# Patient Record
Sex: FEMALE | Race: WHITE | Employment: OTHER | URBAN - METROPOLITAN AREA
[De-identification: names, ages, dates, MRNs, and addresses within clinical notes are randomized per-mention and may not be internally consistent; named-entity substitution may affect disease eponyms.]

---

## 2019-07-10 ENCOUNTER — HOSPITAL ENCOUNTER (OUTPATIENT)
Dept: MAMMOGRAPHY | Age: 84
Discharge: HOME | End: 2019-07-12
Payer: COMMERCIAL

## 2019-07-10 DIAGNOSIS — Z12.39 BREAST CANCER SCREENING: ICD-10-CM

## 2019-07-10 PROCEDURE — 77063 BREAST TOMOSYNTHESIS BI: CPT

## 2020-12-10 ENCOUNTER — HOSPITAL ENCOUNTER (OUTPATIENT)
Dept: MAMMOGRAPHY | Age: 85
Discharge: HOME | End: 2020-12-12
Payer: MEDICARE

## 2020-12-10 PROCEDURE — 77067 SCR MAMMO BI INCL CAD: CPT

## 2022-01-13 ENCOUNTER — HOSPITAL ENCOUNTER (EMERGENCY)
Age: 87
Discharge: HOME | End: 2022-01-13
Payer: MEDICARE

## 2022-01-13 ENCOUNTER — APPOINTMENT (OUTPATIENT)
Dept: CT IMAGING | Age: 87
End: 2022-01-13
Payer: MEDICARE

## 2022-01-13 VITALS
WEIGHT: 130 LBS | OXYGEN SATURATION: 95 % | TEMPERATURE: 97.3 F | BODY MASS INDEX: 25.52 KG/M2 | SYSTOLIC BLOOD PRESSURE: 138 MMHG | HEART RATE: 65 BPM | HEIGHT: 60 IN | RESPIRATION RATE: 18 BRPM | DIASTOLIC BLOOD PRESSURE: 84 MMHG

## 2022-01-13 DIAGNOSIS — S09.90XA ACUTE HEAD INJURY WITHOUT LOSS OF CONSCIOUSNESS, INITIAL ENCOUNTER: Primary | ICD-10-CM

## 2022-01-13 DIAGNOSIS — S80.12XA CONTUSION OF LEFT LOWER LEG, INITIAL ENCOUNTER: ICD-10-CM

## 2022-01-13 PROCEDURE — 70450 CT HEAD/BRAIN W/O DYE: CPT

## 2022-01-13 PROCEDURE — 99211 OFF/OP EST MAY X REQ PHY/QHP: CPT

## 2022-01-13 RX ORDER — ASPIRIN 81 MG/1
81 TABLET ORAL DAILY
COMMUNITY

## 2022-01-13 NOTE — ED PROVIDER NOTES
[address] [facility]  Department of Emergency Medicine   ED  Encounter Note  Admit Date/RoomTime: 2022  4:49 PM  ED Room: MAN/MAN    NAME: Octavia Gore  : 1929  MRN: [id number]     Chief Complaint:  Fall (at [facility] fell and hit shin left leg  and back of head on a piller   floor uneven  today at 340pm fell wozzie )    History of Present Illness       Octavia Gore is a 80 y.o. old female who presents to the emergency department for evaluation after a fall. Patient states about 2 hours ago while shopping in [place] she fell. States that she was holding onto the buggy with her left hand. Went to turn and she stepped on the elevated edge between the carpet and the tiled floor. Lost her balance and fell. States she came down, striking her left shin on the buggy. Thinks she landed on her butt. And she went a little bit to the left side and bumped her head on one of the pillars. She denies loss of consciousness. She denies a headache. Denies dizziness. She states she feels a little woozy, but does not know how to describe that. Patient denies any nausea or vomiting. Patient is not on any blood thinners. She does take a baby aspirin daily. Patient does want a scan of her head. She is concerned for a head bleed. Patient denies any low back or buttock pain. Really the only area of pain she is having is to her left shin where she hit it. It is mild in severity. ROS   Pertinent positives and negatives are stated within HPI, all other systems reviewed and are negative. Past Medical History:  has a past medical history of Hyperlipidemia and Hypertension. Surgical History:  has no past surgical history on file. Social History:  reports that she has never smoked. She has never used smokeless tobacco. She reports that she does not drink alcohol. Family History: family history is not on file.      Allergies: Ciprofloxacin, Pcn [penicillins], and Sulfa antibiotics    Physical Exam   Oxygen Saturation Interpretation: Normal.        ED Triage Vitals [01/13/22 1653]   BP Temp Temp src Pulse Resp SpO2 Height Weight   138/84 97.3 °F (36.3 °C) -- 65 18 95 % 5' (1.524 m) 130 lb (59 kg)       \General:  NAD. Alert and Oriented. Well-appearing. Skin:  Warm, dry. No rashes. Head:  Normocephalic. Atraumatic. Eyes:  EOMI. Conjunctiva normal.  ENT:  Oral mucosa moist.  Airway patent. Pupils equal round and reactive to light. Neck:  Supple. Normal ROM. Respiratory:  No respiratory distress. No labored breathing. Lungs clear without rales, rhonchi or wheezing. Cardiovascular:  Regular rate. No peripheral edema. Extremities warm and good color. Extremities:  Normal ROM. Purple, ecchymotic bruise to the left lower leg, anterior aspect, mid aspect. Left knee is nontender to palpation. Left ankle is nontender to palpation. Back:  Normal ROM. Nontender to palpation. Tailbone is nontender to palpation. Neuro:  Alert and Oriented to person, place, time and situation. Normal LOC. Moves all extremities. Speech fluent. Psych:  Calm and Cooperative. Normal thought process. Normal judgement. Lab / Imaging Results   (All laboratory and radiology results have been personally reviewed by myself)  Labs:  No results found for this visit on 01/13/22. Imaging: All Radiology results interpreted by Radiologist unless otherwise noted. CT Head WO Contrast   Final Result   1. No acute intracranial abnormality. 2. Chronic small vessel ischemic disease. RECOMMENDATIONS:   Unavailable           ED Course / Medical Decision Making   Medications - No data to display     Re-examination:  1/13/22     Time:   Patients symptoms . Consult(s):   None    Procedure(s):  None    MDM:   Patient wants a CT of her head. She is being sent to [facility] radiology department for an outpatient head CT.    1820 results discussed with patient on the phone.   There is no acute process. No intracranial bleed. Patient has been instructed she may go home. Tylenol as needed. Plan of Care/Counseling:  Physician Assistant on duty reviewed today's visit with the patient in addition to providing specific details for the plan of care and counseling regarding the diagnosis and prognosis. Questions are answered at this time and are agreeable with the plan. Assessment      1. Acute head injury without loss of consciousness, initial encounter New Problem   2. Contusion of left lower leg, initial encounter New Problem     Plan   Discharged home. Patient condition is good    New Medications     New Prescriptions    No medications on file     Electronically signed by Berenda Skiff, PA   DD: 1/13/22  **This report was transcribed using voice recognition software. Every effort was made to ensure accuracy; however, inadvertent computerized transcription errors may be present.   END OF ED PROVIDER NOTE       Berenda Skiff, [address]  01/13/22 9851

## 2022-02-17 ENCOUNTER — HOSPITAL ENCOUNTER (OUTPATIENT)
Dept: MAMMOGRAPHY | Age: 87
Discharge: HOME | End: 2022-02-19
Payer: MEDICARE

## 2022-02-17 VITALS — BODY MASS INDEX: 27.21 KG/M2 | HEIGHT: 59 IN | WEIGHT: 135 LBS

## 2022-02-17 DIAGNOSIS — Z12.31 VISIT FOR SCREENING MAMMOGRAM: ICD-10-CM

## 2022-02-17 PROCEDURE — 77063 BREAST TOMOSYNTHESIS BI: CPT

## 2022-08-12 RX ORDER — CHOLECALCIFEROL (VITAMIN D3) 125 MCG
CAPSULE ORAL DAILY
COMMUNITY

## 2022-08-15 NOTE — H&P
History and Physical    Patient's Name/Date of Birth: Yazmin Sheppard / 7/27/1929 (1 y.o.)    Date: August 15, 2022     Chief Complaint: Left hand pain and paresthesias, progressive. HPI: This is a 80years of age, left-hand-dominant, white female, has had pain and paresthesias of her bilateral hands for the past year. She has failed 1 year of conservative management, including behavior modification, nighttime splints, and anti-inflammatories as needed. Symptoms are very progressive with frequent awakenings at night and often severe palmar pains. She describes paresthesias both in the form of prominent numbness and tingling. Based on her age, we have trialed conservative management for 3 to 4 months at a time, which is not solving her problems. Patient returns now for both physical examination, as well as, verbal/written informed consent for outpatient, bilateral staged, carpal tunnel releases. Her son Terry Jaeger is here who is a frequent patient of our office, and is also helping with the consent. Past Medical History:   Diagnosis Date    Hyperlipidemia     Hypertension     Thyroid disease        Past Surgical History:   Procedure Laterality Date    CHOLECYSTECTOMY      EYE SURGERY      HYSTERECTOMY (CERVIX STATUS UNKNOWN)      TONSILLECTOMY         Prior to Admission medications    Medication Sig Start Date End Date Taking?  Authorizing Provider   Cholecalciferol (VITAMIN D) 125 MCG (5000 UT) CAPS Take by mouth daily   Yes Historical Provider, MD   BIOTIN 5000 PO Take by mouth daily   Yes Historical Provider, MD   amLODIPine Besylate (NORVASC PO) Take 5 mg by mouth daily am    Historical Provider, MD   Levothyroxine Sodium (SYNTHROID PO) Take by mouth  Patient not taking: Reported on 8/12/2022    Historical Provider, MD HERNANDEZ Complex-C-Biotin-D-Zinc-FA (VITAL-D RX PO) Take by mouth  Patient not taking: Reported on 8/12/2022    Historical Provider, MD   aspirin 81 MG EC tablet Take 81 mg by mouth in the morning. Follow Dr. Epi Thompson anticoagulation orders. Historical Provider, MD   atenolol (TENORMIN) 25 MG tablet Take 12.5 mg by mouth in the morning. am.    Historical Provider, MD   simvastatin (ZOCOR) 10 MG tablet Take 10 mg by mouth nightly. Historical Provider, MD   lisinopril (PRINIVIL;ZESTRIL) 40 MG tablet Take 40 mg by mouth in the morning. am.    Historical Provider, MD   pantoprazole (PROTONIX) 20 MG tablet Take 20 mg by mouth in the morning. am.    Historical Provider, MD       Pcn [penicillins], Ciprofloxacin, and Sulfa antibiotics    Family History   Problem Relation Age of Onset    Prostate Cancer Father     Prostate Cancer Son        Social History     Socioeconomic History    Marital status:       Spouse name: Not on file    Number of children: Not on file    Years of education: Not on file    Highest education level: Not on file   Occupational History    Not on file   Tobacco Use    Smoking status: Never    Smokeless tobacco: Never   Vaping Use    Vaping Use: Never used   Substance and Sexual Activity    Alcohol use: No    Drug use: Not on file    Sexual activity: Never   Other Topics Concern    Not on file   Social History Narrative    Not on file     Social Determinants of Health     Financial Resource Strain: Not on file   Food Insecurity: Not on file   Transportation Needs: Not on file   Physical Activity: Not on file   Stress: Not on file   Social Connections: Not on file   Intimate Partner Violence: Not on file   Housing Stability: Not on file       Review of Systems:   CONSTITUTIONAL:  negative  EYES:  negative  HEENT:  negative  RESPIRATORY:  negative  CARDIOVASCULAR:  negative  GASTROINTESTINAL:  negative  GENITOURINARY:  negative  INTEGUMENT/BREAST:  negative  HEMATOLOGIC/LYMPHATIC:  negative  ALLERGIC/IMMUNOLOGIC:  negative  ENDOCRINE:  negative  MUSCULOSKELETAL:  negative  NEUROLOGICAL:  negative  BEHAVIOR/PSYCH:  negative    Physical Exam:  Vitals:    08/12/22 1053 Weight: 130 lb (59 kg)   Height: 5' (1.524 m)       CONSTITUTIONAL:  awake, alert, cooperative, no apparent distress, and appears stated age  EYES:  Lids and lashes normal, pupils equal, round and reactive to light, extra ocular muscles intact, sclera clear, conjunctiva normal  ENT:  Normocephalic, without obvious abnormality, atraumatic, sinuses nontender on palpation, external ears without lesions, oral pharynx with moist mucus membranes, tonsils without erythema or exudates, gums normal and good dentition. NECK:  Supple, symmetrical, trachea midline, no adenopathy, thyroid symmetric, not enlarged and no tenderness, skin normal  HEMATOLOGIC/LYMPHATICS:  no cervical lymphadenopathy  BACK:  Symmetric, no curvature, spinous processes are non-tender on palpation, paraspinous muscles are non-tender on palpation, no costal vertebral tenderness  LUNGS:  No increased work of breathing, good air exchange, clear to auscultation bilaterally, no crackles or wheezing  CARDIOVASCULAR:  normal S1 and S2  ABDOMEN: Deferred  CHEST/BREASTS: Deferred  GENITAL/URINARY: Deferred  MUSCULOSKELETAL: Deferred  NEUROLOGIC: Neurological, light touch sensation abnormal, with hypoesthesia on the right and left.  + Median nerve compression test, severe, bilateral  + Tinel, severe, bilateral.  SKIN:  no bruising or bleeding    Assessment:  Active Problems:    * No active hospital problems. *  Resolved Problems:    * No resolved hospital problems. *  1.   Left hand-carpal tunnel syndrome    Plan:  Left hand-carpal tunnel release    Electronically signed by Dg Platt MD on 8/15/22 at 2:06 PM EDT

## 2022-08-16 ENCOUNTER — ANESTHESIA EVENT (OUTPATIENT)
Dept: OPERATING ROOM | Age: 87
End: 2022-08-16
Payer: MEDICARE

## 2022-08-16 NOTE — ANESTHESIA PRE PROCEDURE
Department of Anesthesiology  Preprocedure Note       Name:  Fallon Bowens   Age:  80 y.o.  :  1929                                          MRN:  [id number]         Date:  2022      Surgeon: Guillermo Fermin):  Bebeto Abdi MD    Procedure: Procedure(s):  LEFT HAND CARPAL TUNNEL RELEASE    Medications prior to admission:   Prior to Admission medications    Medication Sig Start Date End Date Taking? Authorizing Provider   Cholecalciferol (VITAMIN D) 125 MCG (5000 UT) CAPS Take by mouth daily   Yes Historical Provider, MD   BIOTIN 5000 PO Take by mouth daily   Yes Historical Provider, MD   amLODIPine Besylate (NORVASC PO) Take 5 mg by mouth daily am    Historical Provider, MD   Levothyroxine Sodium (SYNTHROID PO) Take by mouth  Patient not taking: Reported on 2022    Historical Provider, MD HERNANDEZ Complex-C-Biotin-D-Zinc-FA (VITAL-D RX PO) Take by mouth  Patient not taking: Reported on 2022    Historical Provider, MD   aspirin 81 MG EC tablet Take 81 mg by mouth in the morning. Follow Dr. Brianna Grewal anticoagulation orders. Historical Provider, MD   atenolol (TENORMIN) 25 MG tablet Take 12.5 mg by mouth in the morning. am.    Historical Provider, MD   simvastatin (ZOCOR) 10 MG tablet Take 10 mg by mouth nightly. Historical Provider, MD   lisinopril (PRINIVIL;ZESTRIL) 40 MG tablet Take 40 mg by mouth in the morning. am.    Historical Provider, MD   pantoprazole (PROTONIX) 20 MG tablet Take 20 mg by mouth in the morning. am.    Historical Provider, MD       Current medications:    No current facility-administered medications for this encounter.      Current Outpatient Medications   Medication Sig Dispense Refill    Cholecalciferol (VITAMIN D) 125 MCG (5000 UT) CAPS Take by mouth daily      BIOTIN 5000 PO Take by mouth daily      amLODIPine Besylate (NORVASC PO) Take 5 mg by mouth daily am      Levothyroxine Sodium (SYNTHROID PO) Take by mouth (Patient not taking: Reported on 2022)      B Complex-C-Biotin-D-Zinc-FA (VITAL-D RX PO) Take by mouth (Patient not taking: Reported on 8/12/2022)      aspirin 81 MG EC tablet Take 81 mg by mouth in the morning. Follow Dr. Epi Thompson anticoagulation orders.  atenolol (TENORMIN) 25 MG tablet Take 12.5 mg by mouth in the morning. am.      simvastatin (ZOCOR) 10 MG tablet Take 10 mg by mouth nightly.  lisinopril (PRINIVIL;ZESTRIL) 40 MG tablet Take 40 mg by mouth in the morning. am.      pantoprazole (PROTONIX) 20 MG tablet Take 20 mg by mouth in the morning. am.         Allergies: Allergies   Allergen Reactions    Pcn [Penicillins] Anaphylaxis    Ciprofloxacin      Unable to remember    Sulfa Antibiotics      Unable to remember       Problem List:  There is no problem list on file for this patient. Past Medical History:        Diagnosis Date    Hyperlipidemia     Hypertension     Thyroid disease        Past Surgical History:        Procedure Laterality Date    CHOLECYSTECTOMY      EYE SURGERY      HYSTERECTOMY (CERVIX STATUS UNKNOWN)      TONSILLECTOMY         Social History:    Social History     Tobacco Use    Smoking status: Never    Smokeless tobacco: Never   Substance Use Topics    Alcohol use: No                                Counseling given: Not Answered      Vital Signs (Current):   Vitals:    08/12/22 1053   Weight: 130 lb (59 kg)   Height: 5' (1.524 m)                                              BP Readings from Last 3 Encounters:   01/13/22 138/84       NPO Status:                                                                                 BMI:   Wt Readings from Last 3 Encounters:   02/17/22 135 lb (61.2 kg)   01/13/22 130 lb (59 kg)     Body mass index is 25.39 kg/m².     CBC: No results found for: WBC, RBC, HGB, HCT, MCV, RDW, PLT    CMP:   Lab Results   Component Value Date/Time    GLUCOSE 85 05/12/2011 08:17 AM       POC Tests: No results for input(s): POCGLU, POCNA, POCK, POCCL, POCBUN, POCHEMO, POCHCT in the last 72 hours. Coags: No results found for: PROTIME, INR, APTT    HCG (If Applicable): No results found for: PREGTESTUR, PREGSERUM, HCG, HCGQUANT     ABGs: No results found for: PHART, PO2ART, UJC9GHS, VCR7MWQ, BEART, F1FSZLPH     Type & Screen (If Applicable):  No results found for: LABABO, LABRH    Drug/Infectious Status (If Applicable):  No results found for: HIV, HEPCAB    COVID-19 Screening (If Applicable): No results found for: COVID19        Anesthesia Evaluation  Patient summary reviewed  Airway: Mallampati: III  TM distance: >3 FB   Neck ROM: full  Mouth opening: > = 3 FB   Dental:      Comment: Dentition intact, molar crowns, denies any loose teeth. Pulmonary:Negative Pulmonary ROS breath sounds clear to auscultation                             Cardiovascular:    (+) hypertension:, murmur: Grade 1, hyperlipidemia        Rhythm: regular  Rate: normal                    Neuro/Psych:   Negative Neuro/Psych ROS              GI/Hepatic/Renal:   (+) GERD:,           Endo/Other:    (+) hypothyroidism::., .                 Abdominal:             Vascular: negative vascular ROS. Other Findings:           Anesthesia Plan      MAC     ASA 3       Induction: intravenous. Anesthetic plan and risks discussed with patient. Plan discussed with CRNA.                     Haydee Waters MD   6-34-53

## 2022-08-17 ENCOUNTER — ANESTHESIA (OUTPATIENT)
Dept: OPERATING ROOM | Age: 87
End: 2022-08-17
Payer: MEDICARE

## 2022-08-17 ENCOUNTER — HOSPITAL ENCOUNTER (OUTPATIENT)
Age: 87
Setting detail: OUTPATIENT SURGERY
Discharge: HOME | End: 2022-08-17
Attending: SURGERY | Admitting: SURGERY
Payer: MEDICARE

## 2022-08-17 VITALS
HEART RATE: 59 BPM | SYSTOLIC BLOOD PRESSURE: 141 MMHG | DIASTOLIC BLOOD PRESSURE: 67 MMHG | RESPIRATION RATE: 14 BRPM | TEMPERATURE: 97 F | OXYGEN SATURATION: 96 % | HEIGHT: 60 IN | BODY MASS INDEX: 25.52 KG/M2 | WEIGHT: 130 LBS

## 2022-08-17 DIAGNOSIS — G56.02 CARPAL TUNNEL SYNDROME ON LEFT: Primary | ICD-10-CM

## 2022-08-17 PROCEDURE — 2580000003 HC RX 258

## 2022-08-17 PROCEDURE — 6360000002 HC RX W HCPCS

## 2022-08-17 PROCEDURE — 7100000010 HC PHASE II RECOVERY - FIRST 15 MIN: Performed by: SURGERY

## 2022-08-17 PROCEDURE — 7100000011 HC PHASE II RECOVERY - ADDTL 15 MIN: Performed by: SURGERY

## 2022-08-17 PROCEDURE — 3700000001 HC ADD 15 MINUTES (ANESTHESIA): Performed by: SURGERY

## 2022-08-17 PROCEDURE — 6370000000 HC RX 637 (ALT 250 FOR IP): Performed by: SURGERY

## 2022-08-17 PROCEDURE — 2720000010 HC SURG SUPPLY STERILE: Performed by: SURGERY

## 2022-08-17 PROCEDURE — 2500000003 HC RX 250 WO HCPCS: Performed by: SURGERY

## 2022-08-17 PROCEDURE — 3600000002 HC SURGERY LEVEL 2 BASE: Performed by: SURGERY

## 2022-08-17 PROCEDURE — 2580000003 HC RX 258: Performed by: ANESTHESIOLOGY

## 2022-08-17 PROCEDURE — 2709999900 HC NON-CHARGEABLE SUPPLY: Performed by: SURGERY

## 2022-08-17 PROCEDURE — 3600000012 HC SURGERY LEVEL 2 ADDTL 15MIN: Performed by: SURGERY

## 2022-08-17 PROCEDURE — 3700000000 HC ANESTHESIA ATTENDED CARE: Performed by: SURGERY

## 2022-08-17 RX ORDER — SODIUM CHLORIDE 9 MG/ML
INJECTION, SOLUTION INTRAVENOUS CONTINUOUS PRN
Status: DISCONTINUED | OUTPATIENT
Start: 2022-08-17 | End: 2022-08-17

## 2022-08-17 RX ORDER — SODIUM CHLORIDE, SODIUM LACTATE, POTASSIUM CHLORIDE, CALCIUM CHLORIDE 600; 310; 30; 20 MG/100ML; MG/100ML; MG/100ML; MG/100ML
INJECTION, SOLUTION INTRAVENOUS CONTINUOUS PRN
Status: DISCONTINUED | OUTPATIENT
Start: 2022-08-17 | End: 2022-08-17 | Stop reason: SDUPTHER

## 2022-08-17 RX ORDER — GINSENG 100 MG
CAPSULE ORAL PRN
Status: DISCONTINUED | OUTPATIENT
Start: 2022-08-17 | End: 2022-08-17 | Stop reason: HOSPADM

## 2022-08-17 RX ORDER — TRAMADOL HYDROCHLORIDE 50 MG/1
50 TABLET ORAL EVERY 4 HOURS PRN
Qty: 6 TABLET | Refills: 0 | Status: SHIPPED | OUTPATIENT
Start: 2022-08-17 | End: 2022-08-20

## 2022-08-17 RX ORDER — FENTANYL CITRATE 50 UG/ML
INJECTION, SOLUTION INTRAMUSCULAR; INTRAVENOUS PRN
Status: DISCONTINUED | OUTPATIENT
Start: 2022-08-17 | End: 2022-08-17 | Stop reason: SDUPTHER

## 2022-08-17 RX ORDER — PROPOFOL 10 MG/ML
INJECTION, EMULSION INTRAVENOUS CONTINUOUS PRN
Status: DISCONTINUED | OUTPATIENT
Start: 2022-08-17 | End: 2022-08-17 | Stop reason: SDUPTHER

## 2022-08-17 RX ORDER — SODIUM CHLORIDE, SODIUM LACTATE, POTASSIUM CHLORIDE, CALCIUM CHLORIDE 600; 310; 30; 20 MG/100ML; MG/100ML; MG/100ML; MG/100ML
INJECTION, SOLUTION INTRAVENOUS CONTINUOUS
Status: DISCONTINUED | OUTPATIENT
Start: 2022-08-17 | End: 2022-08-17 | Stop reason: HOSPADM

## 2022-08-17 RX ADMIN — FENTANYL CITRATE 25 MCG: 50 INJECTION INTRAMUSCULAR; INTRAVENOUS at 08:13

## 2022-08-17 RX ADMIN — SODIUM CHLORIDE, POTASSIUM CHLORIDE, SODIUM LACTATE AND CALCIUM CHLORIDE: 600; 310; 30; 20 INJECTION, SOLUTION INTRAVENOUS at 08:08

## 2022-08-17 RX ADMIN — FENTANYL CITRATE 25 MCG: 50 INJECTION INTRAMUSCULAR; INTRAVENOUS at 08:11

## 2022-08-17 RX ADMIN — SODIUM CHLORIDE, POTASSIUM CHLORIDE, SODIUM LACTATE AND CALCIUM CHLORIDE: 600; 310; 30; 20 INJECTION, SOLUTION INTRAVENOUS at 07:42

## 2022-08-17 RX ADMIN — PROPOFOL 50 MCG/KG/MIN: 10 INJECTION, EMULSION INTRAVENOUS at 08:12

## 2022-08-17 ASSESSMENT — PAIN - FUNCTIONAL ASSESSMENT: PAIN_FUNCTIONAL_ASSESSMENT: 0-10

## 2022-08-17 NOTE — BRIEF OP NOTE
Brief Postoperative Note      Patient: Osvaldo Barrios  YOB: 1929  MRN: [id number]    Date of Procedure: 8/17/2022    Pre-Op Diagnosis: 1. Left Hand-Carpal tunnel syndrome  [G56.02]    Post-Op Diagnosis: Same       Procedure(s):  LEFT HAND- CARPAL TUNNEL RELEASE    Surgeon(s):  Aaron Quan M.D.; Hand + Reconstructive surgery    Assistant:  * No surgical staff found *    Anesthesia: Monitor Anesthesia Care    Estimated Blood Loss (mL): Minimal 3 ml    Complications: None    Specimens:   * No specimens in log *    Implants:  * No implants in log *      Drains: * No LDAs found *    Operative Note: Carpal Tunnel Release    Indications : This is a 80 y.o., left hand dominant female, who has had symptoms dating back 2 yrs. They describe discomfort of their upper extremities extending from the hands to the forearms to the arms. The paresthesias do involve severe numbness and tingling at times. The paraesthesias are constant. I did provide the Initial Evaluation on the patient, at our [facility] on 09/29/2020. They now return because it is more convenient time for outpatient hand surgery, so it does not interfere with their career and weekly schedule. I did obtain verbal informed consent for the outpatient hand surgery, while the patient was in the office. I did meet the patient and their family in the preoperative holding room, to again answer questions and outline the operation. I did harriet the surgery site with a purple marker and my initials. The patient did receive a right upper extremity intravenous (iv.) placement per Nursing Team, and was escorted back to the surgery suite. The patient was laid supine on the operating table and then underwent  iv. sedation per the Anesthesia Team. The operating room bed was rotated 90 degrees away from the anesthesia workbench, and the left upper extremity was abducted 70 degrees away from the patient's body and placed onto a padded arm table.

## 2022-08-17 NOTE — DISCHARGE INSTRUCTIONS
Fantasma Mcgovern M.D. Reconstructive Microsurgery      Elevate your operative hand above your heart level for one week. 2. Do NOT change your dressing. 3. Okay to shower or bathe with plastic bag over \"boxing glove\" dressing. 4. Follow up with Dr. Glenn Rosario at [facility], on Tuesday 08/23/22 at 09:45 a.m     6. Patient will be excused from work or school until after first post operative re-check. 7. No driving unless off pain medication for 24 hrs., and only allowed to drive with the unoperated hand. 8. Dr. Glenn Rosario does not have the Medical/Dental bureau service. If you need to reach Dr. Glenn Rosario after hours,        please call one of the [facility] hospitals:    **  Please tell the , You recently had surgery w/ Dr. Glenn Rosario, and have him paged via his cell phone. Please make sure they do not connect to Office, since no when is there after hours. [facility] [phone]    [address] [address] [place]: [phone] or [facility] at [phone] and they will page him. MARCOS Saenz M.D. Dr. Juni Ornelas M.D. [address] [place] [address], [address] [place] [address] 98 [phone]             Infection After Surgery: Care Instructions  Overview  After surgery, an infection is always possible. It doesn't mean that thesurgery didn't go well. Because an infection can be serious, your doctor has taken steps to manage it. Your doctor checked the infection and cleaned it if necessary. Your doctor may have made an opening in the area so that the pus can drain out.  You may have gauze in the cut so that the area will stay open and swelling, warmth, or redness in the area. Red streaks leading from the area. Pus draining from the wound. A new or higher fever. Watch closely for changes in your health, and be sure to contact your doctor ifyou have any problems. Where can you learn more? Go to [email or web address] and sign in to your [employer] account. Enter C340 in the iPrint box to learn more about \"Infection After Surgery: Care Instructions. \"     If you do not have an account, please click on the \"Sign Up Now\" link. Current as of: January 20, 2022               Content Version: 13.3  © 2006-2022 [employer]. Care instructions adapted under license by [facility] ([facility]). If you have questions about a medical condition or this instruction, always ask your healthcare professional. [place] [address] any warranty or liability for your use of this information. Nausea and Vomiting After Surgery: Care Instructions  Your Care Instructions     After you've had surgery, you may feel sick to your stomach (nauseated) or you may vomit. Sometimes anesthesia can make you feel sick. It's a common side effect and often doesn't last long. Pain also can make you feel sick or vomit. After the anesthesia wears off, you may feel pain from the incision (cut). That pain could then upset your stomach. Taking pain medicine can also make you feelsick to your stomach. Whatever the cause, you may get medicine that can help. There are also somethings you can do at home to prevent nausea and feel better. The doctor has checked you carefully, but problems can develop later. If you notice any problems or new symptoms, get medical treatment right away. Follow-up care is a key part of your treatment and safety. Be sure to make and go to all appointments, and call your doctor if you are having problems. It's also a good idea to know your test results and keep alist of the medicines you take.   How can you care for yourself at home? Be safe with medicines. Read and follow all instructions on the label. If the doctor gave you a prescription medicine for pain, take it as prescribed. If you are not taking a prescription pain medicine, ask your doctor if you can take an over-the-counter medicine. Take your pain medicine as soon as you have pain. It works better if you take it before the pain gets bad. Call your doctor if you have any problems with your medicine. Rest in bed until you feel better. To prevent dehydration, drink plenty of fluids. Choose water and other clear liquids until you feel better. If you have kidney, heart, or liver disease and have to limit fluids, talk with your doctor before you increase the amount of fluids you drink. When you are able to eat, try clear soups, mild foods, and liquids until all symptoms are gone for 12 to 48 hours. Other good choices include dry toast, crackers, cooked cereal, and gelatin dessert, such as Jell-O. Do not smoke. Smoking and being around smoke can make nausea worse. If you need help quitting, talk to your doctor about stop-smoking programs and medicines. These can increase your chances of quitting for good. When should you call for help? Call 911  anytime you think you may need emergency care. For example, call if:    You passed out (lost consciousness). Call your doctor now or seek immediate medical care if:    You have new or worse nausea or vomiting. You are too sick to your stomach to drink any fluids. You cannot keep down fluids. You have symptoms of dehydration, such as:  Dry eyes and a dry mouth. Passing only a little urine. Feeling thirstier than usual.     Your pain medicine is not helping. You are dizzy or lightheaded, or you feel like you may faint. Watch closely for changes in your health, and be sure to contact your doctor if:    You do not get better as expected. Where can you learn more?   Go to [email or web address] and sign in to your [employer] account. Enter M536 in the [facility] box to learn more about \"Nausea and Vomiting After Surgery: Care Instructions. \"     If you do not have an account, please click on the \"Sign Up Now\" link. Current as of: March 9, 2022               Content Version: 13.3  © 2006-2022 [employer]. Care instructions adapted under license by [facility] ([facility]). If you have questions about a medical condition or this instruction, always ask your healthcare professional. [place] [address] any warranty or liability for your use of this information.

## 2022-08-17 NOTE — PROGRESS NOTES
Went over discharge instructions with patient & son. Both state they understand all instructions.  Prescription given

## 2022-08-17 NOTE — ANESTHESIA POSTPROCEDURE EVALUATION
Department of Anesthesiology  Postprocedure Note    Patient: Fallon Bowens  MRN: [id number]  YOB: 1929  Date of evaluation: 8/17/2022      Procedure Summary     Date: 08/17/22 Room / Location: [address] [place] [address] 01 / [address] [facility]    Anesthesia Start: 2863 Anesthesia Stop: 2976    Procedure: LEFT HAND CARPAL TUNNEL RELEASE (Left: Wrist) Diagnosis:       Carpal tunnel syndrome on left      (Carpal tunnel syndrome on left [G56.02])    Surgeons: Bebeto Abdi MD Responsible Provider: Citlali Dexter MD    Anesthesia Type: MAC ASA Status: 3          Anesthesia Type: MAC    Joe Phase I: Joe Score: 10    Joe Phase II: Joe Score: 10      Anesthesia Post Evaluation    Patient location during evaluation: bedside  Patient participation: complete - patient participated  Level of consciousness: awake  Pain score: 0  Airway patency: patent  Nausea & Vomiting: no nausea and no vomiting  Complications: no  Cardiovascular status: hemodynamically stable  Respiratory status: acceptable  Hydration status: euvolemic

## 2022-09-16 RX ORDER — TRIAMCINOLONE ACETONIDE 1 MG/G
CREAM TOPICAL 2 TIMES DAILY
COMMUNITY

## 2022-09-18 ENCOUNTER — ANESTHESIA EVENT (OUTPATIENT)
Dept: OPERATING ROOM | Age: 87
End: 2022-09-18
Payer: MEDICARE

## 2022-09-18 NOTE — ANESTHESIA PRE PROCEDURE
Department of Anesthesiology  Preprocedure Note       Name:  Marianela Perez   Age:  80 y.o.  :  1929                                          MRN:  [id number]         Date:  2022      Surgeon: Arcenio Grimes):  Luz Maria Staples MD    Procedure: Procedure(s):  RIGHT HAND CARPAL TUNNEL RELEASE    Medications prior to admission:   Prior to Admission medications    Medication Sig Start Date End Date Taking? Authorizing Provider   triamcinolone (KENALOG) 0.1 % cream Apply topically 2 times daily Apply topically 2 times daily. Yes Historical Provider, MD   Cholecalciferol (VITAMIN D) 125 MCG (5000 UT) CAPS Take by mouth daily    Historical Provider, MD   BIOTIN 5000 PO Take by mouth daily    Historical Provider, MD   amLODIPine Besylate (NORVASC PO) Take 5 mg by mouth daily am    Historical Provider, MD   Levothyroxine Sodium (SYNTHROID PO) Take by mouth  Patient not taking: Reported on 2022    Historical Provider, MD   B Complex-C-Biotin-D-Zinc-FA (VITAL-D RX PO) Take by mouth  Patient not taking: Reported on 2022    Historical Provider, MD   aspirin 81 MG EC tablet Take 81 mg by mouth in the morning. Follow Dr. Florence Young anticoagulation orders. Historical Provider, MD   atenolol (TENORMIN) 25 MG tablet Take 12.5 mg by mouth in the morning. am.    Historical Provider, MD   simvastatin (ZOCOR) 10 MG tablet Take 10 mg by mouth nightly. Historical Provider, MD   lisinopril (PRINIVIL;ZESTRIL) 40 MG tablet Take 40 mg by mouth in the morning. am.    Historical Provider, MD   pantoprazole (PROTONIX) 20 MG tablet Take 20 mg by mouth in the morning. am.    Historical Provider, MD       Current medications:    No current facility-administered medications for this encounter. Current Outpatient Medications   Medication Sig Dispense Refill    triamcinolone (KENALOG) 0.1 % cream Apply topically 2 times daily Apply topically 2 times daily.       Cholecalciferol (VITAMIN D) 125 MCG (5000 UT) CAPS Take by mouth daily      BIOTIN 5000 PO Take by mouth daily      amLODIPine Besylate (NORVASC PO) Take 5 mg by mouth daily am      Levothyroxine Sodium (SYNTHROID PO) Take by mouth (Patient not taking: Reported on 8/12/2022)      B Complex-C-Biotin-D-Zinc-FA (VITAL-D RX PO) Take by mouth (Patient not taking: Reported on 8/12/2022)      aspirin 81 MG EC tablet Take 81 mg by mouth in the morning. Follow Dr. Brianna Grewal anticoagulation orders.  atenolol (TENORMIN) 25 MG tablet Take 12.5 mg by mouth in the morning. am.      simvastatin (ZOCOR) 10 MG tablet Take 10 mg by mouth nightly.  lisinopril (PRINIVIL;ZESTRIL) 40 MG tablet Take 40 mg by mouth in the morning. am.      pantoprazole (PROTONIX) 20 MG tablet Take 20 mg by mouth in the morning. am.         Allergies: Allergies   Allergen Reactions    Pcn [Penicillins] Anaphylaxis    Ciprofloxacin      Unable to remember    Sulfa Antibiotics      Unable to remember       Problem List:  There is no problem list on file for this patient. Past Medical History:        Diagnosis Date    CAD (coronary artery disease)     carotid blockage 19%    Cancer ([facility] [address]     skin CA 2021    Hyperlipidemia     Hypertension     Thyroid disease        Past Surgical History:        Procedure Laterality Date    CARPAL TUNNEL RELEASE Left 8/17/2022    LEFT HAND CARPAL TUNNEL RELEASE performed by Bebeto Abdi MD at [address] [facility] (CERVIX STATUS UNKNOWN)      TONSILLECTOMY         Social History:    Social History     Tobacco Use    Smoking status: Never    Smokeless tobacco: Never   Substance Use Topics    Alcohol use:  No                                Counseling given: Not Answered      Vital Signs (Current):   Vitals:    09/16/22 1416   Weight: 129 lb (58.5 kg)   Height: 5' (1.524 m)                                              BP Readings from Last 3 Encounters:   08/17/22 (!) 141/67   01/13/22 138/84       NPO Status:                                                                                 BMI:   Wt Readings from Last 3 Encounters:   09/16/22 129 lb (58.5 kg)   08/17/22 130 lb (59 kg)   02/17/22 135 lb (61.2 kg)     Body mass index is 25.19 kg/m². CBC: No results found for: WBC, RBC, HGB, HCT, MCV, RDW, PLT    CMP:   Lab Results   Component Value Date/Time    GLUCOSE 85 05/12/2011 08:17 AM       POC Tests: No results for input(s): POCGLU, POCNA, POCK, POCCL, POCBUN, POCHEMO, POCHCT in the last 72 hours. Coags: No results found for: PROTIME, INR, APTT    HCG (If Applicable): No results found for: PREGTESTUR, PREGSERUM, HCG, HCGQUANT     ABGs: No results found for: PHART, PO2ART, EUK3MHR, AHE7APA, BEART, N7IYPTZE     Type & Screen (If Applicable):  No results found for: LABABO, LABRH    Drug/Infectious Status (If Applicable):  No results found for: HIV, HEPCAB    COVID-19 Screening (If Applicable): No results found for: COVID19        Anesthesia Evaluation  Patient summary reviewed  Airway: Mallampati: IV  TM distance: >3 FB   Neck ROM: full  Mouth opening: > = 3 FB   Dental:          Pulmonary:Negative Pulmonary ROS and normal exam  breath sounds clear to auscultation                             Cardiovascular:    (+) hypertension:, CAD:, hyperlipidemia        Rhythm: regular  Rate: normal           Beta Blocker:  Dose within 24 Hrs         Neuro/Psych:   Negative Neuro/Psych ROS              GI/Hepatic/Renal: Neg GI/Hepatic/Renal ROS            Endo/Other:    (+) hypothyroidism::., malignancy/cancer (Skin 2021. ). Abdominal:             Vascular: negative vascular ROS. ROS comment: Carotid Blockage 19 %. . Other Findings:           Anesthesia Plan      MAC     ASA 3       Induction: intravenous. continuous noninvasive hemodynamic monitor    Anesthetic plan and risks discussed with patient. Plan discussed with CRNA.     Attending anesthesiologist reviewed and agrees with Preprocedure content                Megan Guadalupe MD   9/18/2022

## 2022-09-21 ENCOUNTER — ANESTHESIA (OUTPATIENT)
Dept: OPERATING ROOM | Age: 87
End: 2022-09-21
Payer: MEDICARE

## 2022-09-21 ENCOUNTER — HOSPITAL ENCOUNTER (OUTPATIENT)
Age: 87
Setting detail: OUTPATIENT SURGERY
Discharge: HOME | End: 2022-09-21
Attending: SURGERY | Admitting: SURGERY
Payer: MEDICARE

## 2022-09-21 VITALS
BODY MASS INDEX: 24.94 KG/M2 | RESPIRATION RATE: 16 BRPM | DIASTOLIC BLOOD PRESSURE: 58 MMHG | HEART RATE: 55 BPM | OXYGEN SATURATION: 95 % | HEIGHT: 60 IN | TEMPERATURE: 97.4 F | SYSTOLIC BLOOD PRESSURE: 122 MMHG | WEIGHT: 127 LBS

## 2022-09-21 PROCEDURE — 2500000003 HC RX 250 WO HCPCS: Performed by: SURGERY

## 2022-09-21 PROCEDURE — 3600000002 HC SURGERY LEVEL 2 BASE: Performed by: SURGERY

## 2022-09-21 PROCEDURE — 7100000010 HC PHASE II RECOVERY - FIRST 15 MIN: Performed by: SURGERY

## 2022-09-21 PROCEDURE — 2709999900 HC NON-CHARGEABLE SUPPLY: Performed by: SURGERY

## 2022-09-21 PROCEDURE — 3700000001 HC ADD 15 MINUTES (ANESTHESIA): Performed by: SURGERY

## 2022-09-21 PROCEDURE — 3700000000 HC ANESTHESIA ATTENDED CARE: Performed by: SURGERY

## 2022-09-21 PROCEDURE — 6370000000 HC RX 637 (ALT 250 FOR IP): Performed by: SURGERY

## 2022-09-21 PROCEDURE — 2580000003 HC RX 258: Performed by: ANESTHESIOLOGY

## 2022-09-21 PROCEDURE — 3600000012 HC SURGERY LEVEL 2 ADDTL 15MIN: Performed by: SURGERY

## 2022-09-21 PROCEDURE — 6360000002 HC RX W HCPCS

## 2022-09-21 PROCEDURE — 7100000011 HC PHASE II RECOVERY - ADDTL 15 MIN: Performed by: SURGERY

## 2022-09-21 PROCEDURE — 2720000010 HC SURG SUPPLY STERILE: Performed by: SURGERY

## 2022-09-21 RX ORDER — ONDANSETRON 2 MG/ML
INJECTION INTRAMUSCULAR; INTRAVENOUS PRN
Status: DISCONTINUED | OUTPATIENT
Start: 2022-09-21 | End: 2022-09-21 | Stop reason: SDUPTHER

## 2022-09-21 RX ORDER — SODIUM CHLORIDE, SODIUM LACTATE, POTASSIUM CHLORIDE, CALCIUM CHLORIDE 600; 310; 30; 20 MG/100ML; MG/100ML; MG/100ML; MG/100ML
INJECTION, SOLUTION INTRAVENOUS CONTINUOUS
Status: DISCONTINUED | OUTPATIENT
Start: 2022-09-21 | End: 2022-09-21 | Stop reason: HOSPADM

## 2022-09-21 RX ORDER — PROPOFOL 10 MG/ML
INJECTION, EMULSION INTRAVENOUS CONTINUOUS PRN
Status: DISCONTINUED | OUTPATIENT
Start: 2022-09-21 | End: 2022-09-21 | Stop reason: SDUPTHER

## 2022-09-21 RX ORDER — FENTANYL CITRATE 50 UG/ML
INJECTION, SOLUTION INTRAMUSCULAR; INTRAVENOUS PRN
Status: DISCONTINUED | OUTPATIENT
Start: 2022-09-21 | End: 2022-09-21 | Stop reason: SDUPTHER

## 2022-09-21 RX ORDER — LIDOCAINE HYDROCHLORIDE 20 MG/ML
INJECTION, SOLUTION INTRAVENOUS PRN
Status: DISCONTINUED | OUTPATIENT
Start: 2022-09-21 | End: 2022-09-21 | Stop reason: SDUPTHER

## 2022-09-21 RX ORDER — GINSENG 100 MG
CAPSULE ORAL PRN
Status: DISCONTINUED | OUTPATIENT
Start: 2022-09-21 | End: 2022-09-21 | Stop reason: ALTCHOICE

## 2022-09-21 RX ADMIN — SODIUM CHLORIDE, POTASSIUM CHLORIDE, SODIUM LACTATE AND CALCIUM CHLORIDE: 600; 310; 30; 20 INJECTION, SOLUTION INTRAVENOUS at 09:47

## 2022-09-21 RX ADMIN — ONDANSETRON 4 MG: 2 INJECTION INTRAMUSCULAR; INTRAVENOUS at 10:12

## 2022-09-21 RX ADMIN — FENTANYL CITRATE 50 MCG: 50 INJECTION INTRAMUSCULAR; INTRAVENOUS at 10:12

## 2022-09-21 RX ADMIN — FENTANYL CITRATE 25 MCG: 50 INJECTION INTRAMUSCULAR; INTRAVENOUS at 10:26

## 2022-09-21 RX ADMIN — PROPOFOL 75 MCG/KG/MIN: 10 INJECTION, EMULSION INTRAVENOUS at 10:12

## 2022-09-21 RX ADMIN — LIDOCAINE HYDROCHLORIDE 50 MG: 20 INJECTION, SOLUTION INTRAVENOUS at 10:12

## 2022-09-21 ASSESSMENT — PAIN - FUNCTIONAL ASSESSMENT: PAIN_FUNCTIONAL_ASSESSMENT: NONE - DENIES PAIN

## 2022-09-21 NOTE — DISCHARGE INSTRUCTIONS
Fantasma Mcgovern M.D. Reconstructive Microsurgery      Elevate your operative hand above your heart level for one week. 2. Do NOT change your dressing. 3. Okay to shower or bathe with plastic bag over \"boxing glove\" dressing. 4. Follow up with Dr. Glenn Rosario at [facility],  on date 09/29/22.  5. Tuesday at 12:15 p.  6. Patient will be excused from work or school until after first post operative re-check. 7. No driving unless off pain medication for 24 hrs., and only allowed to drive with the unoperated hand. 8. Dr. Glenn Rosario does not have the Medical/Dental bureau service. If you need to reach Dr. Glenn Rosario after hours,        please call one of the [facility] hospitals:    **  Please tell the , You recently had surgery w/ Dr. Glenn Rosario, and have him paged via his cell phone. Please make sure they do not connect to Office, since no when is there after hours. [facility] [phone]    [address] [address]            [address] [facility]: [phone] or [facility] at [phone] and they will page him. Magdy Hernandez M.D. [facility] [place] [address]                                                [phone]                                                                            Nausea and Vomiting After Surgery: Care Instructions  Your Care Instructions     After you've had surgery, you may feel sick to your stomach (nauseated) or you may vomit. Sometimes anesthesia can make you feel sick. It's a common side effect and often doesn't last long. Pain also can make you feel sick or vomit. After the anesthesia wears off, you may feel pain from the incision (cut). That pain could then upset your stomach.  Taking pain medicine can also make you feel sick to your stomach. Whatever the cause, you may get medicine that can help. There are also some things you can do at home to prevent nausea and feel better. The doctor has checked you carefully, but problems can develop later. If you notice any problems or new symptoms, get medical treatment right away. Follow-up care is a key part of your treatment and safety. Be sure to make and go to all appointments, and call your doctor if you are having problems. It's also a good idea to know your test results and keep a list of the medicines you take. How can you care for yourself at home? Be safe with medicines. Read and follow all instructions on the label. If the doctor gave you a prescription medicine for pain, take it as prescribed. If you are not taking a prescription pain medicine, ask your doctor if you can take an over-the-counter medicine. Take your pain medicine as soon as you have pain. It works better if you take it before the pain gets bad. Call your doctor if you have any problems with your medicine. Rest in bed until you feel better. To prevent dehydration, drink plenty of fluids. Choose water and other clear liquids until you feel better. If you have kidney, heart, or liver disease and have to limit fluids, talk with your doctor before you increase the amount of fluids you drink. When you are able to eat, try clear soups, mild foods, and liquids until all symptoms are gone for 12 to 48 hours. Other good choices include dry toast, crackers, cooked cereal, and gelatin dessert, such as Jell-O. Do not smoke. Smoking and being around smoke can make nausea worse. If you need help quitting, talk to your doctor about stop-smoking programs and medicines. These can increase your chances of quitting for good. When should you call for help? Call 911  anytime you think you may need emergency care. For example, call if:    You passed out (lost consciousness).    Call your doctor now or

## 2022-09-21 NOTE — ANESTHESIA POSTPROCEDURE EVALUATION
Department of Anesthesiology  Postprocedure Note    Patient: Soledad Gupta  MRN: [id number]  YOB: 1929  Date of evaluation: 9/21/2022      Procedure Summary     Date: 09/21/22 Room / Location: [address] [place] [address] / [facility] ([facility])    Anesthesia Start: 1008 Anesthesia Stop: 1110    Procedure: RIGHT HAND CARPAL TUNNEL RELEASE (Right: Hand) Diagnosis:       Carpal tunnel syndrome on right      (Carpal tunnel syndrome on right [G56.01])    Surgeons: Virgil Alegre MD Responsible Provider: Angelo Spencer MD    Anesthesia Type: MAC ASA Status: 3          Anesthesia Type: MAC    Joe Phase I: Joe Score: 10    Joe Phase II: Joe Score: 10      Anesthesia Post Evaluation    Patient location during evaluation: PACU  Patient participation: complete - patient participated  Level of consciousness: awake  Pain score: 0  Airway patency: patent  Nausea & Vomiting: no nausea  Complications: no  Cardiovascular status: blood pressure returned to baseline  Respiratory status: acceptable  Hydration status: euvolemic

## 2022-09-21 NOTE — OP NOTE
Operative Note      Patient: Yazmin Sheppard  YOB: 1929  MRN: [id number]    Date of Procedure: 9/21/2022    Pre-Op Diagnosis:   Right Hand- Carpal tunnel syndrome  [G56.01]    Post-Op Diagnosis: Same       Procedure(s):  RIGHT HAND- CARPAL TUNNEL RELEASE    Surgeon(s):  Marlen Fay M.D.; Hand + Reconstructive Surgery    Assistant:   * No surgical staff found *    Anesthesia: Monitor Anesthesia Care    Estimated Blood Loss (mL): Minimal 3 ml    Complications: None    Specimens:   * No specimens in log *    Implants:  * No implants in log *      Drains: * No LDAs found *    Operative Note: Carpal Tunnel Release    Indications : This is a 80 y.o., right hand dominant female, who has had symptoms dating back couple of years. They describe discomfort of their upper extremities extending from the hands to the forearms to the arms. The paresthesias do involve severe numbness and tingling at times. The paraesthesias are constant. I did provide the Initial Evaluation on the patient, at our [place] office on 09/29/2020. They now return because it is more convenient time for outpatient hand surgery, so it does not interfere with their career and weekly schedule. I did obtain verbal informed consent for the outpatient hand surgery, while the patient was in the office. I did meet the patient and their family in the preoperative holding room, to again answer questions and outline the operation. I did harriet the surgery site with a purple marker and my initials. The patient did receive a left upper extremity intravenous (iv.) placement per Nursing Team, and was escorted back to the surgery suite. The patient was laid supine on the operating table and then underwent  iv. sedation per the Anesthesia Team. The operating room bed was rotated 90 degrees away from the anesthesia workbench, and the right upper extremity was abducted 70 degrees away from the patient's body and placed onto a padded arm table. Prior to anything invasive, the Surgical Team did perform an official timeout. All members were in accordance. I then provided the right wrist, volar Median nerve regional block and local incisional blocks, per my standard protocol. We then prepped the right upper extremity with Betadine solution from the fingertips down to the Webril. A sterile field was then created using sterile sheets and sterile towels. I did place a sterile fluff towel underneath the hand and wrist for protection throughout the case. I then identified anatomical landmarks, namely:  thenar and hypothenar eminences, imaginary radial border of the ring finger, [place] cardinal line. My standard 4 cm, longitudinal, proximal palmar incision was then planned, in line with the patient's own palmar skin creases. I first tested the patient with a #10 blade scalpel, noting adequate anesthesia. I then incised the skin only with a scalpel. Small punctate bleeders were controlled with Bovie cautery, and bipolar [id number] was used in the deeper structures closer to the sensory nerves and Median nerve proper. I then transitioned to double-wide skin hooks for retraction along with blunt dissection with Littler scissors down through the subcutaneous adipose tissue. I specifically searched for the main trunk of the Superficial branch of the Median nerve. This structure was not found in the midportion of the wound, and suspected to be in its more anatomic location, underneath the radial soft tissue flap. I then transitioned to dull Kailash retractors, in combination with longitudinal, sharp dissection with a # 15 blade scalpel. I dissected down through the Superficial Palmar fascia, continuing through the Palmaris brevis muscle, and finally through the Transverse carpal ligament.  Once into the Carpal tunnel, I then dissected from the mid wound distally, with the distal extent of the dissection being identification of the midpalmar adipose tissue, at the level of the Superficial Palmar arch and common digital nerve branches. All points of decompression were done under direct visualization. I then went back to the midportion of the wound, and dissected from the midwound proximally, with the proximal extent of the dissection, being the proximal edge of the flexor retinaculum. The pathologic findings included severe flattening compression in the proximal, mid and distal portions of the Carpal Tunnel, abnormal dark red discoloration, no amount of intrinsic vasculature dilatation, severe capillary injection (signifying chronic inflammatory changes), severe amount of perineural inflammatory hypertrophy, moderate amount of inflammatory adhesions of the Median nerve to the ulnar and radial lateral sidewalls. I then encircled the Median nerve bluntly with a right-angle dissector. I then encircled the Median nerve with a saline- soaked, quarter-inch Penrose drain. The drain was used for gentle radial and ulnar retraction of the Median nerve to gain access to the deeper Carpal Tunnel contents. I then provided a mild amount of underlying finger flexor tenosynovectomies. This maneuver was done sharply with the Metzenbaum scissors. I then copiously irrigated the wound out with refrigerated normal saline. I did remove the drain and passively extend the digits, realigning the Carpal Tunnel anatomy. I then closed the wound in one layer using interrupted vertical mattress 4-0 nylon suture. At the conclusion of the case, the sponge, instrument, and needle counts were correct x2. The estimated blood loss was 3 mL. The extremity was wiped clean of Betadine and blood, and my standard hand and wrist soft dressing was then applied. The patient tolerated the operation well, was sent to the postanesthesia care unit in stable fair condition, escorted by myself, RN, and CRNA.      Electronically signed by Aneesh Murray MD on 9/21/2022 at 11:26 AM  Cc Cheri Estrada MD

## 2022-09-21 NOTE — H&P
History and Physical    Patient's Name/Date of Birth: Erich Hanley / 7/27/1929 (89 y.o.)    Date: September 21, 2022     Chief Complaint: right hand pain    HPI:   80 yrs of age, right hand dominant, white female w/ progressive pain and paraesthesias. Past Medical History:   Diagnosis Date    CAD (coronary artery disease)     carotid blockage 19%    Cancer ([address]     skin CA 2021    Hyperlipidemia     Hypertension     Thyroid disease        Past Surgical History:   Procedure Laterality Date    CARPAL TUNNEL RELEASE Left 8/17/2022    LEFT HAND CARPAL TUNNEL RELEASE performed by Tomy uCnningham MD at [facility] (CERVIX STATUS UNKNOWN)      TONSILLECTOMY         Prior to Admission medications    Medication Sig Start Date End Date Taking? Authorizing Provider   triamcinolone (KENALOG) 0.1 % cream Apply topically 2 times daily Apply topically 2 times daily. Yes Historical Provider, MD   Cholecalciferol (VITAMIN D) 125 MCG (5000 UT) CAPS Take by mouth daily    Historical Provider, MD   BIOTIN 5000 PO Take by mouth daily    Historical Provider, MD   amLODIPine Besylate (NORVASC PO) Take 5 mg by mouth daily am    Historical Provider, MD   Levothyroxine Sodium (SYNTHROID PO) Take by mouth  Patient not taking: No sig reported    Historical ProviderMD HERNANDEZ Complex-C-Biotin-D-Zinc-FA (VITAL-D RX PO) Take by mouth  Patient not taking: No sig reported    Historical Provider, MD   aspirin 81 MG EC tablet Take 81 mg by mouth in the morning. Follow Dr. Irma Perkins anticoagulation orders. Historical Provider, MD   atenolol (TENORMIN) 25 MG tablet Take 12.5 mg by mouth in the morning. am.    Historical Provider, MD   simvastatin (ZOCOR) 10 MG tablet Take 10 mg by mouth nightly. Historical Provider, MD   lisinopril (PRINIVIL;ZESTRIL) 40 MG tablet Take 40 mg by mouth in the morning.  am.    Historical Provider, MD   pantoprazole (PROTONIX) 20 MG tablet Take 20 mg by mouth in the morning. am.    Historical Provider, MD Morse [penicillins], Ciprofloxacin, and Sulfa antibiotics    Family History   Problem Relation Age of Onset    Prostate Cancer Father     Prostate Cancer Son        Social History     Socioeconomic History    Marital status:       Spouse name: Not on file    Number of children: Not on file    Years of education: Not on file    Highest education level: Not on file   Occupational History    Not on file   Tobacco Use    Smoking status: Never    Smokeless tobacco: Never   Vaping Use    Vaping Use: Never used   Substance and Sexual Activity    Alcohol use: No    Drug use: Not on file    Sexual activity: Never   Other Topics Concern    Not on file   Social History Narrative    Not on file     Social Determinants of Health     Financial Resource Strain: Not on file   Food Insecurity: Not on file   Transportation Needs: Not on file   Physical Activity: Not on file   Stress: Not on file   Social Connections: Not on file   Intimate Partner Violence: Not on file   Housing Stability: Not on file       Review of Systems:   CONSTITUTIONAL:  negative  EYES:  negative  HEENT:  negative  RESPIRATORY:  negative  CARDIOVASCULAR:  negative  GASTROINTESTINAL:  negative  GENITOURINARY:  negative  INTEGUMENT/BREAST:  negative  HEMATOLOGIC/LYMPHATIC:  negative  ALLERGIC/IMMUNOLOGIC:  negative  ENDOCRINE:  negative  MUSCULOSKELETAL:  negative  NEUROLOGICAL:  negative  BEHAVIOR/PSYCH:  negative    Physical Exam:  Vitals:    09/16/22 1416 09/21/22 0912   BP:  125/68   Pulse:  55   Resp:  16   Temp:  97.4 °F (36.3 °C)   TempSrc:  Temporal   SpO2:  96%   Weight: 129 lb (58.5 kg) 127 lb (57.6 kg)   Height: 5' (1.524 m)        CONSTITUTIONAL:  awake, alert, cooperative, no apparent distress, and appears stated age  EYES:  Lids and lashes normal, pupils equal, round and reactive to light, extra ocular muscles intact, sclera clear, conjunctiva normal  ENT:  Normocephalic, without obvious abnormality, atraumatic, sinuses nontender on palpation, external ears without lesions, oral pharynx with moist mucus membranes, tonsils without erythema or exudates, gums normal and good dentition. NECK:  Supple, symmetrical, trachea midline, no adenopathy, thyroid symmetric, not enlarged and no tenderness, skin normal  HEMATOLOGIC/LYMPHATICS:  no cervical lymphadenopathy  BACK:  Symmetric, no curvature, spinous processes are non-tender on palpation, paraspinous muscles are non-tender on palpation, no costal vertebral tenderness  LUNGS:  No increased work of breathing, good air exchange, clear to auscultation bilaterally, no crackles or wheezing  CARDIOVASCULAR:  normal S1 and S2  ABDOMEN:  deferred  CHEST/BREASTS:  deferred  GENITAL/URINARY:  deferred  MUSCULOSKELETAL:  There is no redness, warmth, or swelling of the joints. Full range of motion noted. Motor strength is 5 out of 5 all extremities bilaterally. Tone is normal.  NEUROLOGIC:  Awake, alert, oriented to name, place and time. Cranial nerves II-XII are grossly intact. Motor is 5 out of 5 bilaterally. Cerebellar finger to nose, heel to shin intact.  gait is normal.  Sensory deficit- Madeian nerve distribution  SKIN:  no bruising or bleeding    Assessment:  Active Problems:    * No active hospital problems. *  Resolved Problems:    * No resolved hospital problems.  *  Right hand- Carpal Tunnel Sydrome    Plan:  Right Hand- Carpal Tunnel release    Electronically signed by Ena Jang MD on 9/21/22 at 10:12 AM EDT

## 2022-09-29 NOTE — OP NOTE
Operative Note      Patient: Karuna Mariee  YOB: 1929  MRN: [id number]    Date of Procedure: 8/17/2022    Pre-Op Diagnosis: Carpal tunnel syndrome on left [G56.02]    Post-Op Diagnosis: Same       Procedure(s):  LEFT HAND CARPAL TUNNEL RELEASE    Surgeon(s):  Isha Martinez MD    Assistant:   * No surgical staff found *    Anesthesia: Monitor Anesthesia Care    Estimated Blood Loss (mL): Minimal  3 ml    Complications: None    Specimens:   * No specimens in log *    Implants:  * No implants in log *      Drains: * No LDAs found *    Operative Note:            Carpal Tunnel Release     Indications : This is a 80 y.o., left hand dominant female, who has had symptoms dating back 2 yrs. They describe discomfort of their upper extremities extending from the hands to the forearms to the arms. The paresthesias do involve severe numbness and tingling at times. The paraesthesias are constant. I did provide the Initial Evaluation on the patient, at our [facility] on 09/29/2020. They now return because it is more convenient time for outpatient hand surgery, so it does not interfere with their career and weekly schedule. I did obtain verbal informed consent for the outpatient hand surgery, while the patient was in the office. I did meet the patient and their family in the preoperative holding room, to again answer questions and outline the operation. I did harriet the surgery site with a purple marker and my initials. The patient did receive a right upper extremity intravenous (iv.) placement per Nursing Team, and was escorted back to the surgery suite. The patient was laid supine on the operating table and then underwent  iv. sedation per the Anesthesia Team. The operating room bed was rotated 90 degrees away from the anesthesia workbench, and the left upper extremity was abducted 70 degrees away from the patient's body and placed onto a padded arm table.       Prior to anything invasive, the Surgical Team did perform an official timeout. All members were in accordance. I then provided the left wrist, volar Median nerve regional block and local incisional blocks, per my standard protocol. We then prepped the left upper extremity with Betadine solution from the fingertips down to the Webril. A sterile field was then created using sterile sheets and sterile towels. I did place a sterile fluff towel underneath the hand and wrist for protection throughout the case. I then identified anatomical landmarks, namely:  thenar and hypothenar eminences, imaginary radial border of the ring finger, Sevilla cardinal line. My standard 4 cm, longitudinal, proximal palmar incision was then planned, in line with the patient's own palmar skin creases. I first tested the patient with a #10 blade scalpel, noting adequate anesthesia. I then incised the skin only with a scalpel. Small punctate bleeders were controlled with Bovie cautery, and bipolar [id number] was used in the deeper structures closer to the sensory nerves and Median nerve proper. I then transitioned to double-wide skin hooks for retraction along with blunt dissection with Littler scissors down through the subcutaneous adipose tissue. I specifically searched for the main trunk of the Superficial branch of the Median nerve. This structure was not found in the midportion of the wound, and suspected to be in its more anatomic location, underneath the radial soft tissue flap. I then transitioned to dull Kailash retractors, in combination with longitudinal, sharp dissection with a # 15 blade scalpel. I dissected down through the Superficial Palmar fascia, continuing through the Palmaris brevis muscle, and finally through the Transverse carpal ligament.  Once into the Carpal tunnel, I then dissected from the mid wound distally, with the distal extent of the dissection being identification of the midpalmar adipose tissue, at the level of the Superficial Palmar arch and common digital nerve branches. All points of decompression were done under direct visualization. I then went back to the midportion of the wound, and dissected from the midwound proximally, with the proximal extent of the dissection, being the proximal edge of the flexor retinaculum. The pathologic findings included severe flattening compression in the  mid and distal portions of the Carpal Tunnel, severe abnormal red to purple discoloration, mild amount of intrinsic vasculature dilatation, severe capillary injection (signifying chronic inflammatory changes), moderate amount of perineural inflammatory hypertrophy, moderate amount of inflammatory adhesions of the Median nerve to the ulnar and radial lateral sidewalls. I then encircled the Median nerve bluntly with a right-angle dissector. I then encircled the Median nerve with a saline- soaked, quarter-inch Penrose drain. The drain was used for gentle radial and ulnar retraction of the Median nerve to gain access to the deeper Carpal Tunnel contents. I then provided a moderate amount of underlying finger flexor tenosynovectomies. This maneuver was done sharply with the Metzenbaum scissors. I then copiously irrigated the wound out with refrigerated normal saline. I did remove the drain and passively extend the digits, realigning the Carpal Tunnel anatomy. I then closed the wound in one layer using interrupted vertical mattress 4-0 nylon suture. At the conclusion of the case, the sponge, instrument, and needle counts were correct x2. The estimated blood loss was 3 mL. The extremity was wiped clean of Betadine and blood, and my standard hand and wrist soft dressing was then applied. The patient tolerated the operation well, was sent to the postanesthesia care unit in stable fair condition, escorted by myself, RN, and CRNA.    Eletronically signed by Jennyfer Rascon MD on 8/17/2022 at 9:08 AM  Cc Jane Beatty MD                 Electronically signed by Griffin Cho MD on 9/29/2022 at 5:46 PM

## 2023-12-29 ENCOUNTER — HOSPITAL ENCOUNTER (EMERGENCY)
Age: 88
Discharge: HOME | End: 2023-12-29
Payer: MEDICARE

## 2023-12-29 VITALS
SYSTOLIC BLOOD PRESSURE: 147 MMHG | TEMPERATURE: 98.2 F | BODY MASS INDEX: 25 KG/M2 | DIASTOLIC BLOOD PRESSURE: 79 MMHG | RESPIRATION RATE: 18 BRPM | HEART RATE: 82 BPM | WEIGHT: 128 LBS | OXYGEN SATURATION: 95 %

## 2023-12-29 DIAGNOSIS — J06.9 VIRAL URI WITH COUGH: Primary | ICD-10-CM

## 2023-12-29 PROCEDURE — 99211 OFF/OP EST MAY X REQ PHY/QHP: CPT

## 2023-12-29 RX ORDER — PREDNISONE 20 MG/1
20 TABLET ORAL 2 TIMES DAILY
Qty: 10 TABLET | Refills: 0 | Status: SHIPPED | OUTPATIENT
Start: 2023-12-29 | End: 2024-01-03

## 2023-12-29 RX ORDER — BROMPHENIRAMINE MALEATE, PSEUDOEPHEDRINE HYDROCHLORIDE, AND DEXTROMETHORPHAN HYDROBROMIDE 2; 30; 10 MG/5ML; MG/5ML; MG/5ML
5 SYRUP ORAL 4 TIMES DAILY PRN
Qty: 140 ML | Refills: 0 | Status: SHIPPED | OUTPATIENT
Start: 2023-12-29 | End: 2024-01-05

## 2023-12-29 ASSESSMENT — PAIN - FUNCTIONAL ASSESSMENT: PAIN_FUNCTIONAL_ASSESSMENT: 0-10

## 2023-12-29 ASSESSMENT — PAIN SCALES - GENERAL: PAINLEVEL_OUTOF10: 0

## 2023-12-29 NOTE — DISCHARGE INSTRUCTIONS
Green sputum does not always need infection, your oxygen level is normal, your lungs are clear. You likely have a viral illness. You will start prednisone twice daily for 5 days, Bromfed-DM as needed for cough. If you develop any shortness of breath, chest pain or high fevers later in illness please either follow-up with your PCP or in the ER.

## 2024-12-13 ENCOUNTER — HOSPITAL ENCOUNTER (OUTPATIENT)
Dept: GENERAL RADIOLOGY | Age: 88
Discharge: HOME | End: 2024-12-15
Payer: MEDICARE

## 2024-12-13 VITALS — HEIGHT: 59 IN | BODY MASS INDEX: 25 KG/M2 | WEIGHT: 124 LBS

## 2024-12-13 DIAGNOSIS — Z12.31 ENCOUNTER FOR SCREENING MAMMOGRAM FOR BREAST CANCER: ICD-10-CM

## 2024-12-13 PROCEDURE — 77063 BREAST TOMOSYNTHESIS BI: CPT

## 2025-04-21 ENCOUNTER — APPOINTMENT (OUTPATIENT)
Dept: ULTRASOUND IMAGING | Age: 89
End: 2025-04-21
Payer: MEDICARE

## 2025-04-21 ENCOUNTER — APPOINTMENT (OUTPATIENT)
Dept: GENERAL RADIOLOGY | Age: 89
End: 2025-04-21
Payer: MEDICARE

## 2025-04-21 PROCEDURE — 93970 EXTREMITY STUDY: CPT

## 2025-04-21 PROCEDURE — 71046 X-RAY EXAM CHEST 2 VIEWS: CPT

## 2025-04-21 PROCEDURE — 99285 EMERGENCY DEPT VISIT HI MDM: CPT

## 2025-04-21 ASSESSMENT — LIFESTYLE VARIABLES
HOW MANY STANDARD DRINKS CONTAINING ALCOHOL DO YOU HAVE ON A TYPICAL DAY: PATIENT DOES NOT DRINK
HOW OFTEN DO YOU HAVE A DRINK CONTAINING ALCOHOL: NEVER

## 2025-04-22 ENCOUNTER — HOSPITAL ENCOUNTER (EMERGENCY)
Age: 89
Discharge: HOME | End: 2025-04-22
Attending: EMERGENCY MEDICINE
Payer: MEDICARE

## 2025-04-22 VITALS
DIASTOLIC BLOOD PRESSURE: 88 MMHG | HEART RATE: 51 BPM | WEIGHT: 125 LBS | BODY MASS INDEX: 25.25 KG/M2 | TEMPERATURE: 97.9 F | SYSTOLIC BLOOD PRESSURE: 175 MMHG | OXYGEN SATURATION: 99 % | RESPIRATION RATE: 16 BRPM

## 2025-04-22 DIAGNOSIS — R60.0 PERIPHERAL EDEMA: Primary | ICD-10-CM

## 2025-04-22 LAB
ALBUMIN SERPL-MCNC: 4.2 G/DL (ref 3.5–5.2)
ALP SERPL-CCNC: 78 U/L (ref 35–104)
ALT SERPL-CCNC: 15 U/L (ref 0–32)
ANION GAP SERPL CALCULATED.3IONS-SCNC: 12 MMOL/L (ref 7–16)
AST SERPL-CCNC: 19 U/L (ref 0–31)
BASOPHILS # BLD: 0.02 K/UL (ref 0–0.2)
BASOPHILS NFR BLD: 0 % (ref 0–2)
BILIRUB DIRECT SERPL-MCNC: <0.2 MG/DL (ref 0–0.3)
BILIRUB INDIRECT SERPL-MCNC: NORMAL MG/DL (ref 0–1)
BILIRUB SERPL-MCNC: 0.7 MG/DL (ref 0–1.2)
BNP SERPL-MCNC: 393 PG/ML (ref 0–450)
BUN SERPL-MCNC: 14 MG/DL (ref 6–23)
CALCIUM SERPL-MCNC: 9.5 MG/DL (ref 8.6–10.2)
CHLORIDE SERPL-SCNC: 101 MMOL/L (ref 98–107)
CO2 SERPL-SCNC: 25 MMOL/L (ref 22–29)
CREAT SERPL-MCNC: 0.7 MG/DL (ref 0.5–1)
EOSINOPHIL # BLD: 0.09 K/UL (ref 0.05–0.5)
EOSINOPHILS RELATIVE PERCENT: 1 % (ref 0–6)
ERYTHROCYTE [DISTWIDTH] IN BLOOD BY AUTOMATED COUNT: 14.8 % (ref 11.5–15)
GFR, ESTIMATED: 80 ML/MIN/1.73M2
GLUCOSE SERPL-MCNC: 101 MG/DL (ref 74–99)
HCT VFR BLD AUTO: 42.9 % (ref 34–48)
HGB BLD-MCNC: 14 G/DL (ref 11.5–15.5)
IMM GRANULOCYTES # BLD AUTO: <0.03 K/UL (ref 0–0.58)
IMM GRANULOCYTES NFR BLD: 0 % (ref 0–5)
INR PPP: 1
LACTATE BLDV-SCNC: 0.7 MMOL/L (ref 0.5–2.2)
LIPASE SERPL-CCNC: 29 U/L (ref 13–60)
LYMPHOCYTES NFR BLD: 2.21 K/UL (ref 1.5–4)
LYMPHOCYTES RELATIVE PERCENT: 33 % (ref 20–42)
MAGNESIUM SERPL-MCNC: 2 MG/DL (ref 1.6–2.6)
MCH RBC QN AUTO: 30.4 PG (ref 26–35)
MCHC RBC AUTO-ENTMCNC: 32.6 G/DL (ref 32–34.5)
MCV RBC AUTO: 93.1 FL (ref 80–99.9)
MONOCYTES NFR BLD: 0.63 K/UL (ref 0.1–0.95)
MONOCYTES NFR BLD: 9 % (ref 2–12)
NEUTROPHILS NFR BLD: 56 % (ref 43–80)
NEUTS SEG NFR BLD: 3.73 K/UL (ref 1.8–7.3)
PLATELET # BLD AUTO: 220 K/UL (ref 130–450)
PMV BLD AUTO: 9.6 FL (ref 7–12)
POTASSIUM SERPL-SCNC: 3.3 MMOL/L (ref 3.5–5)
PROT SERPL-MCNC: 6.8 G/DL (ref 6.4–8.3)
PROTHROMBIN TIME: 10.3 SEC (ref 9.3–12.4)
RBC # BLD AUTO: 4.61 M/UL (ref 3.5–5.5)
SODIUM SERPL-SCNC: 138 MMOL/L (ref 132–146)
TROPONIN I SERPL HS-MCNC: 16 NG/L (ref 0–14)
TROPONIN I SERPL HS-MCNC: 17 NG/L (ref 0–14)
TROPONIN I SERPL HS-MCNC: 20 NG/L (ref 0–14)
WBC OTHER # BLD: 6.7 K/UL (ref 4.5–11.5)

## 2025-04-22 PROCEDURE — 83880 ASSAY OF NATRIURETIC PEPTIDE: CPT

## 2025-04-22 PROCEDURE — 96374 THER/PROPH/DIAG INJ IV PUSH: CPT

## 2025-04-22 PROCEDURE — 82248 BILIRUBIN DIRECT: CPT

## 2025-04-22 PROCEDURE — 83690 ASSAY OF LIPASE: CPT

## 2025-04-22 PROCEDURE — 84484 ASSAY OF TROPONIN QUANT: CPT

## 2025-04-22 PROCEDURE — 6370000000 HC RX 637 (ALT 250 FOR IP): Performed by: EMERGENCY MEDICINE

## 2025-04-22 PROCEDURE — 6360000002 HC RX W HCPCS: Performed by: EMERGENCY MEDICINE

## 2025-04-22 PROCEDURE — 83735 ASSAY OF MAGNESIUM: CPT

## 2025-04-22 PROCEDURE — 85610 PROTHROMBIN TIME: CPT

## 2025-04-22 PROCEDURE — 93005 ELECTROCARDIOGRAM TRACING: CPT | Performed by: NURSE PRACTITIONER

## 2025-04-22 PROCEDURE — 80053 COMPREHEN METABOLIC PANEL: CPT

## 2025-04-22 PROCEDURE — 83605 ASSAY OF LACTIC ACID: CPT

## 2025-04-22 PROCEDURE — 85025 COMPLETE CBC W/AUTO DIFF WBC: CPT

## 2025-04-22 RX ORDER — FUROSEMIDE 10 MG/ML
20 INJECTION INTRAMUSCULAR; INTRAVENOUS ONCE
Status: COMPLETED | OUTPATIENT
Start: 2025-04-22 | End: 2025-04-22

## 2025-04-22 RX ADMIN — FUROSEMIDE 20 MG: 10 INJECTION, SOLUTION INTRAMUSCULAR; INTRAVENOUS at 04:55

## 2025-04-22 RX ADMIN — POTASSIUM BICARBONATE 40 MEQ: 782 TABLET, EFFERVESCENT ORAL at 04:54

## 2025-04-22 ASSESSMENT — ENCOUNTER SYMPTOMS
EYE PAIN: 0
WHEEZING: 0
COUGH: 0
VOMITING: 0
NAUSEA: 0
SHORTNESS OF BREATH: 0
EYE DISCHARGE: 0
EYE REDNESS: 0
SINUS PRESSURE: 0
BACK PAIN: 0
ABDOMINAL DISTENTION: 0
DIARRHEA: 0
SORE THROAT: 0

## 2025-04-22 NOTE — ED PROVIDER NOTES
Patient is a 89 y/o female who presents to the ED with swelling of both of her legs. Patient states that she had onset of swelling in her ankles two days ago. She states that she has been on her feet a lot and her swelling is now also in both of her lower legs. She denies any shortness of breath. She denies any chest pain.         Review of Systems   Constitutional:  Negative for chills and fever.   HENT:  Negative for ear pain, sinus pressure and sore throat.    Eyes:  Negative for pain, discharge and redness.   Respiratory:  Negative for cough, shortness of breath and wheezing.    Cardiovascular:  Positive for leg swelling. Negative for chest pain.   Gastrointestinal:  Negative for abdominal distention, diarrhea, nausea and vomiting.   Genitourinary:  Negative for dysuria and frequency.   Musculoskeletal:  Negative for arthralgias and back pain.   Skin:  Negative for rash and wound.   Neurological:  Negative for weakness and headaches.   Hematological:  Negative for adenopathy.   All other systems reviewed and are negative.       Physical Exam  Vitals and nursing note reviewed.   Constitutional:       General: She is not in acute distress.  HENT:      Head: Normocephalic and atraumatic.      Right Ear: External ear normal.      Left Ear: External ear normal.      Nose: Nose normal.      Mouth/Throat:      Mouth: Mucous membranes are moist.   Eyes:      Conjunctiva/sclera: Conjunctivae normal.      Pupils: Pupils are equal, round, and reactive to light.   Cardiovascular:      Rate and Rhythm: Normal rate and regular rhythm.      Heart sounds: No murmur heard.  Pulmonary:      Effort: Pulmonary effort is normal. No respiratory distress.      Breath sounds: Normal breath sounds. No stridor. No wheezing, rhonchi or rales.   Abdominal:      General: Bowel sounds are normal. There is no distension.      Palpations: Abdomen is soft.      Tenderness: There is no abdominal tenderness. There is no guarding.

## 2025-04-22 NOTE — ED TRIAGE NOTES
Department of Emergency Medicine  FIRST PROVIDER TRIAGE NOTE             Independent MLP           4/21/25  8:56 PM EDT    Date of Encounter: 4/21/25   MRN: [id number]      HPI: Karina Serrato is a 89 y.o. female who presents to the ED for No chief complaint on file.   Doctor wanted her to come a week ago for leg swelling x one week  Both ankles swollen x two days  Denied redness drainage fever or chills  No chest pain sob    On baby aspirin, htn , zocor, thyroid medications, and vitamins    ROS: Negative for headache or dizziness.    PE: Gen Appearance/Constitutional: alert  HEENT: NC/NT. PERRLA,  Airway patent.  Neck: supple  CV: regular rate  Pulm: CTA bilat  GI: soft and NT  Musculoskeletal: moves all extremities x 4  Lymphatics: no edema     Initial Plan of Care: All treatment areas with department are currently occupied. Plan to order/Initiate the following while awaiting opening in ED: labs, EKG, and imaging studies.  Initiate Treatment-Testing, Proceed toTreatment Area When Bed Available for ED Attending/MLP to Continue Care    Electronically signed by JAVIER Rodríguez - CON   DD: 4/21/25

## 2025-04-23 LAB
EKG ATRIAL RATE: 60 BPM
EKG P AXIS: 73 DEGREES
EKG P-R INTERVAL: 232 MS
EKG Q-T INTERVAL: 424 MS
EKG QRS DURATION: 82 MS
EKG QTC CALCULATION (BAZETT): 424 MS
EKG R AXIS: -22 DEGREES
EKG T AXIS: 6 DEGREES
EKG VENTRICULAR RATE: 60 BPM

## 2025-04-23 PROCEDURE — 93010 ELECTROCARDIOGRAM REPORT: CPT | Performed by: INTERNAL MEDICINE

## 2025-06-05 ENCOUNTER — HOSPITAL ENCOUNTER (EMERGENCY)
Age: 89
Discharge: HOME | End: 2025-06-05
Attending: EMERGENCY MEDICINE
Payer: MEDICARE

## 2025-06-05 ENCOUNTER — APPOINTMENT (OUTPATIENT)
Dept: GENERAL RADIOLOGY | Age: 89
End: 2025-06-05
Payer: MEDICARE

## 2025-06-05 VITALS
TEMPERATURE: 97.7 F | DIASTOLIC BLOOD PRESSURE: 58 MMHG | RESPIRATION RATE: 24 BRPM | SYSTOLIC BLOOD PRESSURE: 157 MMHG | OXYGEN SATURATION: 100 % | HEART RATE: 64 BPM

## 2025-06-05 DIAGNOSIS — R42 LIGHTHEADED: ICD-10-CM

## 2025-06-05 DIAGNOSIS — S40.021A CONTUSION OF BOTH UPPER EXTREMITIES, INITIAL ENCOUNTER: Primary | ICD-10-CM

## 2025-06-05 DIAGNOSIS — S40.022A CONTUSION OF BOTH UPPER EXTREMITIES, INITIAL ENCOUNTER: Primary | ICD-10-CM

## 2025-06-05 LAB
ANION GAP SERPL CALCULATED.3IONS-SCNC: 8 MMOL/L (ref 7–16)
BACTERIA URNS QL MICRO: ABNORMAL
BASOPHILS # BLD: 0.01 K/UL (ref 0–0.2)
BASOPHILS NFR BLD: 0 % (ref 0–2)
BILIRUB UR QL STRIP: NEGATIVE
BUN SERPL-MCNC: 13 MG/DL (ref 6–23)
CALCIUM SERPL-MCNC: 9.5 MG/DL (ref 8.6–10.2)
CHLORIDE SERPL-SCNC: 108 MMOL/L (ref 98–107)
CLARITY UR: CLEAR
CO2 SERPL-SCNC: 28 MMOL/L (ref 22–29)
COLOR UR: YELLOW
CREAT SERPL-MCNC: 0.8 MG/DL (ref 0.5–1)
EKG ATRIAL RATE: 53 BPM
EKG P AXIS: 82 DEGREES
EKG P-R INTERVAL: 288 MS
EKG Q-T INTERVAL: 434 MS
EKG QRS DURATION: 88 MS
EKG QTC CALCULATION (BAZETT): 407 MS
EKG R AXIS: -7 DEGREES
EKG T AXIS: -1 DEGREES
EKG VENTRICULAR RATE: 53 BPM
EOSINOPHIL # BLD: 0.08 K/UL (ref 0.05–0.5)
EOSINOPHILS RELATIVE PERCENT: 1 % (ref 0–6)
EPI CELLS #/AREA URNS HPF: ABNORMAL /HPF
ERYTHROCYTE [DISTWIDTH] IN BLOOD BY AUTOMATED COUNT: 14.1 % (ref 11.5–15)
GFR, ESTIMATED: 70 ML/MIN/1.73M2
GLUCOSE SERPL-MCNC: 88 MG/DL (ref 74–99)
GLUCOSE UR STRIP-MCNC: NEGATIVE MG/DL
HCT VFR BLD AUTO: 41 % (ref 34–48)
HGB BLD-MCNC: 13.2 G/DL (ref 11.5–15.5)
HGB UR QL STRIP.AUTO: ABNORMAL
IMM GRANULOCYTES # BLD AUTO: 0.03 K/UL (ref 0–0.58)
IMM GRANULOCYTES NFR BLD: 0 % (ref 0–5)
INR PPP: 1
KETONES UR STRIP-MCNC: NEGATIVE MG/DL
LEUKOCYTE ESTERASE UR QL STRIP: NEGATIVE
LYMPHOCYTES NFR BLD: 1.98 K/UL (ref 1.5–4)
LYMPHOCYTES RELATIVE PERCENT: 24 % (ref 20–42)
MCH RBC QN AUTO: 30.3 PG (ref 26–35)
MCHC RBC AUTO-ENTMCNC: 32.2 G/DL (ref 32–34.5)
MCV RBC AUTO: 94.3 FL (ref 80–99.9)
MONOCYTES NFR BLD: 0.64 K/UL (ref 0.1–0.95)
MONOCYTES NFR BLD: 8 % (ref 2–12)
NEUTROPHILS NFR BLD: 66 % (ref 43–80)
NEUTS SEG NFR BLD: 5.38 K/UL (ref 1.8–7.3)
NITRITE UR QL STRIP: NEGATIVE
PARTIAL THROMBOPLASTIN TIME: 31.8 SEC (ref 24.5–35.1)
PH UR STRIP: 7 [PH] (ref 5–8)
PLATELET # BLD AUTO: 253 K/UL (ref 130–450)
PMV BLD AUTO: 9.7 FL (ref 7–12)
POTASSIUM SERPL-SCNC: 4.6 MMOL/L (ref 3.5–5)
PROT UR STRIP-MCNC: NEGATIVE MG/DL
PROTHROMBIN TIME: 10.3 SEC (ref 9.3–12.4)
RBC # BLD AUTO: 4.35 M/UL (ref 3.5–5.5)
RBC #/AREA URNS HPF: ABNORMAL /HPF
SODIUM SERPL-SCNC: 144 MMOL/L (ref 132–146)
SP GR UR STRIP: <1.005 (ref 1–1.03)
TROPONIN I SERPL HS-MCNC: 22 NG/L (ref 0–14)
TROPONIN I SERPL HS-MCNC: 23 NG/L (ref 0–14)
UROBILINOGEN UR STRIP-ACNC: 0.2 EU/DL (ref 0–1)
WBC #/AREA URNS HPF: ABNORMAL /HPF
WBC OTHER # BLD: 8.1 K/UL (ref 4.5–11.5)

## 2025-06-05 PROCEDURE — 85730 THROMBOPLASTIN TIME PARTIAL: CPT

## 2025-06-05 PROCEDURE — 81001 URINALYSIS AUTO W/SCOPE: CPT

## 2025-06-05 PROCEDURE — 93005 ELECTROCARDIOGRAM TRACING: CPT | Performed by: NURSE PRACTITIONER

## 2025-06-05 PROCEDURE — 73060 X-RAY EXAM OF HUMERUS: CPT

## 2025-06-05 PROCEDURE — 93010 ELECTROCARDIOGRAM REPORT: CPT | Performed by: INTERNAL MEDICINE

## 2025-06-05 PROCEDURE — 84484 ASSAY OF TROPONIN QUANT: CPT

## 2025-06-05 PROCEDURE — 85610 PROTHROMBIN TIME: CPT

## 2025-06-05 PROCEDURE — 73070 X-RAY EXAM OF ELBOW: CPT

## 2025-06-05 PROCEDURE — 85025 COMPLETE CBC W/AUTO DIFF WBC: CPT

## 2025-06-05 PROCEDURE — 99285 EMERGENCY DEPT VISIT HI MDM: CPT

## 2025-06-05 PROCEDURE — 80048 BASIC METABOLIC PNL TOTAL CA: CPT

## 2025-06-05 ASSESSMENT — PAIN SCALES - GENERAL
PAINLEVEL_OUTOF10: 0
PAINLEVEL_OUTOF10: 1

## 2025-06-05 ASSESSMENT — PAIN - FUNCTIONAL ASSESSMENT
PAIN_FUNCTIONAL_ASSESSMENT: NONE - DENIES PAIN
PAIN_FUNCTIONAL_ASSESSMENT: 0-10

## 2025-06-05 ASSESSMENT — PAIN DESCRIPTION - DESCRIPTORS: DESCRIPTORS: DISCOMFORT

## 2025-06-05 ASSESSMENT — LIFESTYLE VARIABLES
HOW OFTEN DO YOU HAVE A DRINK CONTAINING ALCOHOL: NEVER
HOW MANY STANDARD DRINKS CONTAINING ALCOHOL DO YOU HAVE ON A TYPICAL DAY: PATIENT DOES NOT DRINK

## 2025-06-05 ASSESSMENT — PAIN DESCRIPTION - ORIENTATION: ORIENTATION: RIGHT

## 2025-06-05 ASSESSMENT — PAIN DESCRIPTION - LOCATION: LOCATION: ARM

## 2025-06-05 NOTE — ED PROVIDER NOTES
Shared JUMA-ED Attending Visit.          [facility] EMERGENCY DEPARTMENT  EMERGENCY DEPARTMENT ENCOUNTER        Pt Name: Karina Serrato  MRN: [id number]  Birthdate 7/27/1929  Date of evaluation: 6/5/2025  Provider: JAVIER Weiss - CNP  PCP: Zion Lima MD  Note Started: 10:17 AM EDT 6/5/25    CHIEF COMPLAINT       Chief Complaint   Patient presents with    Bleeding/Bruising     Bilateral arms, no thinners, on 81 mg aspirin daily.       HISTORY OF PRESENT ILLNESS: 1 or more Elements   History From: Patient, patient's son, patient's medical record    Karina Serrato is a 89 y.o. female who presents to the emergency department today for evaluation of 2 chief complaints.  She states that she has some bruising noted to her right humerus and right elbow.  She states that the bruising started up near the mid humerus about a week ago and now has progressed kind of down toward the right elbow over the last couple of days.  Patient denies any injury or trauma directly.  She has no pain to the area.  She has not had any swelling.  She states that she has a bruise on her left forearm as well which came up about a few weeks ago when she saw her primary care provider and she was advised by them that her \"skin was frail\" and that she just bruises easily because of her age.  Patient is left-hand dominant.  Patient reports limited range of motion to right shoulder secondary to chronic problems which has been ongoing.  Patient has no other reported complaints regarding the bruising on her right arm.  Patient additionally complaining of \" lightheadedness\".  Patient states that this started when she woke up this morning.  She states that it is a \"wooziness\".  She states that she has had this from  \"time to time\" but typically it will be present when she wakes up in the morning and then go away.  She states that the symptoms have persisted which is why she wants to be evaluated for this here

## (undated) DEVICE — INTENDED FOR TISSUE SEPARATION, AND OTHER PROCEDURES THAT REQUIRE A SHARP SURGICAL BLADE TO PUNCTURE OR CUT.: Brand: BARD-PARKER ® STAINLESS STEEL BLADES

## (undated) DEVICE — PEN: MARKING STD 100/CS: Brand: MEDICAL ACTION INDUSTRIES

## (undated) DEVICE — CUFF TOURNIQUET 18 SNG BLADDER DUAL PORT

## (undated) DEVICE — BANDAGE COMPR W4INXL5YD WHT BGE POLY COT M E WRP WV HK AND

## (undated) DEVICE — SOLUTION SCRB 32OZ 7.5% POVIDONE IOD BTL GENTLE EFFECTIVE

## (undated) DEVICE — 12 ML SYRINGE,LUER-LOCK TIP: Brand: MONOJECT

## (undated) DEVICE — NEPTUNE E-SEP SMOKE EVACUATION PENCIL, COATED, 70MM BLADE, PUSH BUTTON SWITCH: Brand: NEPTUNE E-SEP

## (undated) DEVICE — CONTROL SYRINGE LUER-LOCK TIP: Brand: MONOJECT

## (undated) DEVICE — MARKER,SKIN,WI/RULER AND LABELS: Brand: MEDLINE

## (undated) DEVICE — BASIC PACK: Brand: CONVERTORS

## (undated) DEVICE — BANDAGE,GAUZE,BULKEE II,4.5"X4.1YD,STRL: Brand: MEDLINE

## (undated) DEVICE — Z DISCONTINUED NO SUB IDED DRAIN PENROSE L12IN 0.25IN USED TO PROMOTE DRNAGE IN OPN

## (undated) DEVICE — SOLUTION IV IRRIG POUR BRL 0.9% SODIUM CHL 2F7124

## (undated) DEVICE — TOWEL OR BLUEE 16X26IN ST 8 PACK ORB08 16X26ORTWL

## (undated) DEVICE — GAUZE,SPONGE,4"X4",16PLY,XRAY,STRL,LF: Brand: MEDLINE

## (undated) DEVICE — BLADE ES ELASTOMERIC COAT INSUL DURABLE BEND UPTO 90DEG

## (undated) DEVICE — 4-PORT MANIFOLD: Brand: NEPTUNE 2

## (undated) DEVICE — HANDLE CVR PATENTED RETENTION DISC STRL LIGHT SHLD

## (undated) DEVICE — SUTURE ETHLN SZ 4-0 L18IN NONABSORBABLE BLK L19MM PS-2 3/8 1667H

## (undated) DEVICE — DRESSING PETRO W3XL3IN OIL EMUL N ADH GZ KNIT IMPREG CELOS

## (undated) DEVICE — TIBURON EXTREMITY SHEET: Brand: CONVERTORS

## (undated) DEVICE — GOWN SURG XL SMS FAB NONREINFORCED RAGLAN SLV HK LOOP CLSR

## (undated) DEVICE — 1810 FOAM BLOCK NEEDLE COUNTER: Brand: DEVON

## (undated) DEVICE — ELECTRODE PT RET AD L9FT HI MOIST COND ADH HYDRGEL CORDED

## (undated) DEVICE — ELECTRODE NDL TOT L2.13IN EXPOSED L3CM ACT L3MM TIP

## (undated) DEVICE — GLOVE ORANGE PI 8 1/2   MSG9085

## (undated) DEVICE — DOUBLE BASIN SET: Brand: MEDLINE INDUSTRIES, INC.

## (undated) DEVICE — BLADE OPHTH STRL LTX FREE DISP

## (undated) DEVICE — NEEDLE HYPO 25GA L1.5IN BLU POLYPR HUB S STL REG BVL STR

## (undated) DEVICE — CATHETER IV 20GA L1.16IN OD1.080MM ID0.800MM 60ML/MIN PNK

## (undated) DEVICE — PAD N ADH W3XL4IN POLY COT SFT PERF FLM EASILY CUT ABSRB

## (undated) DEVICE — STOCKINETTE COMPR W4XL54IN 2 PLY COT

## (undated) DEVICE — NEEDLE HYPO 18GA L1.5IN PNK POLYPR HUB S STL THN WALL FILL

## (undated) DEVICE — 20 ML SYRINGE REGULAR TIP: Brand: MONOJECT